# Patient Record
Sex: FEMALE | Race: WHITE | NOT HISPANIC OR LATINO | Employment: OTHER | URBAN - NONMETROPOLITAN AREA
[De-identification: names, ages, dates, MRNs, and addresses within clinical notes are randomized per-mention and may not be internally consistent; named-entity substitution may affect disease eponyms.]

---

## 2019-05-13 ENCOUNTER — HISTORICAL (OUTPATIENT)
Dept: ADMINISTRATIVE | Facility: HOSPITAL | Age: 74
End: 2019-05-13

## 2023-02-24 ENCOUNTER — LAB REQUISITION (OUTPATIENT)
Dept: LAB | Facility: HOSPITAL | Age: 78
End: 2023-02-24
Payer: MEDICARE

## 2023-02-24 DIAGNOSIS — R53.83 OTHER FATIGUE: ICD-10-CM

## 2023-02-24 LAB
T4 FREE SERPL-MCNC: 1.11 NG/DL (ref 0.78–2.19)
TSH SERPL-ACNC: 1.76 UIU/ML (ref 0.36–3.74)

## 2023-02-24 PROCEDURE — 84439 ASSAY OF FREE THYROXINE: CPT | Performed by: INTERNAL MEDICINE

## 2023-02-24 PROCEDURE — 84443 ASSAY THYROID STIM HORMONE: CPT | Performed by: INTERNAL MEDICINE

## 2023-07-18 ENCOUNTER — HOSPITAL ENCOUNTER (OUTPATIENT)
Dept: RADIOLOGY | Facility: HOSPITAL | Age: 78
Discharge: HOME | End: 2023-07-18
Attending: INTERNAL MEDICINE
Payer: MEDICARE

## 2023-07-18 DIAGNOSIS — M79.671 RIGHT FOOT PAIN: ICD-10-CM

## 2023-07-18 PROCEDURE — 73630 X-RAY EXAM OF FOOT: CPT | Mod: TC,RT

## 2024-12-08 ENCOUNTER — HOSPITAL ENCOUNTER (EMERGENCY)
Facility: HOSPITAL | Age: 79
Discharge: HOME | End: 2024-12-08
Attending: FAMILY MEDICINE
Payer: MEDICARE

## 2024-12-08 VITALS
HEART RATE: 62 BPM | RESPIRATION RATE: 18 BRPM | OXYGEN SATURATION: 99 % | BODY MASS INDEX: 34.99 KG/M2 | DIASTOLIC BLOOD PRESSURE: 73 MMHG | SYSTOLIC BLOOD PRESSURE: 166 MMHG | TEMPERATURE: 97 F | WEIGHT: 210 LBS | HEIGHT: 65 IN

## 2024-12-08 DIAGNOSIS — M20.011 MALLET FINGER OF RIGHT HAND: Primary | ICD-10-CM

## 2024-12-08 PROCEDURE — 99283 EMERGENCY DEPT VISIT LOW MDM: CPT | Mod: 25

## 2024-12-08 NOTE — ED PROVIDER NOTES
"Encounter Date: 12/8/2024       History     Chief Complaint   Patient presents with    Hand Pain     Right 3rd finger pain after it got caught in a towel and heard a "pop"  painful to bind      Dyan presents with c/o right 3rd finger pain after it got stuck in towel rack and she heard a pop.  It is now painful to bend the finger.         The history is provided by the patient.     Review of patient's allergies indicates:  No Known Allergies  Past Medical History:   Diagnosis Date    Diabetes mellitus     Hypertension      History reviewed. No pertinent surgical history.  No family history on file.  Social History     Tobacco Use    Smoking status: Never    Smokeless tobacco: Never   Substance Use Topics    Alcohol use: Never    Drug use: Never     Review of Systems   Constitutional:  Negative for fatigue.   Respiratory:  Negative for cough.    Cardiovascular:  Negative for chest pain.   Musculoskeletal:  Positive for arthralgias (right 3rd finger pain). Negative for back pain, gait problem, joint swelling, myalgias, neck pain and neck stiffness.   Hematological:  Does not bruise/bleed easily.       Physical Exam     Initial Vitals [12/08/24 1131]   BP Pulse Resp Temp SpO2   (!) 179/79 65 18 98 °F (36.7 °C) 98 %      MAP       --         Physical Exam    Nursing note and vitals reviewed.  Constitutional: She appears well-developed and well-nourished. No distress.   Cardiovascular:  Normal rate, regular rhythm and normal heart sounds.           Pulmonary/Chest: Breath sounds normal.   Abdominal: Abdomen is soft. There is no abdominal tenderness. There is no rebound and no guarding.   Musculoskeletal:         General: Normal range of motion.      Right hand: Tenderness present.        Hands:       Comments: Mild tenderness to the distal right middle finger and the distal finger is flexed and she cannot extend it - swan neck deformity        Neurological: She is alert and oriented to person, place, and time.   Skin: " Skin is warm and dry.   Psychiatric: She has a normal mood and affect. Her behavior is normal. Thought content normal.         ED Course   Procedures  Labs Reviewed - No data to display       Imaging Results              X-Ray Finger 2 or More Views Right (Final result)  Result time 12/08/24 11:59:58      Final result by Tyrone Bagley MD (12/08/24 11:59:58)                   Impression:      Punctate osseous fragments along the dorsal aspect of the middle phalanx of the right middle finger with no donor site and no soft tissue swelling.  These are favored to represent chronic changes however an acute avulsion fracture cannot be entirely excluded.      Electronically signed by: Tyrone Bagley MD  Date:    12/08/2024  Time:    11:59               Narrative:    EXAMINATION:  Three radiographic views of the FINGER (RIGHT).    CLINICAL HISTORY:  right 3rd finger pain;    TECHNIQUE:  Three radiographic views of the FINGER (RIGHT)    COMPARISON:  None.    FINDINGS:  Three views of the right middle finger demonstrate punctate osseous fragments along the dorsal aspect of the middle phalanx.  There is no donor site.  There is no soft tissue swelling.                                       Medications - No data to display  Medical Decision Making                                    Clinical Impression:  Final diagnoses:  [M20.011] Mallet finger of right hand (Primary)                 CHELY Mulligan, FNP-C  12/08/24 1606